# Patient Record
Sex: MALE | Race: ASIAN | Employment: OTHER | URBAN - METROPOLITAN AREA
[De-identification: names, ages, dates, MRNs, and addresses within clinical notes are randomized per-mention and may not be internally consistent; named-entity substitution may affect disease eponyms.]

---

## 2020-06-25 ENCOUNTER — HOSPITAL ENCOUNTER (OUTPATIENT)
Dept: GENERAL RADIOLOGY | Age: 80
Discharge: HOME | End: 2020-06-25
Attending: FAMILY MEDICINE
Payer: MEDICARE

## 2020-06-25 ENCOUNTER — OFFICE VISIT (OUTPATIENT)
Dept: FAMILY MEDICINE CLINIC | Facility: CLINIC | Age: 80
End: 2020-06-25
Payer: MEDICARE

## 2020-06-25 VITALS
HEART RATE: 78 BPM | TEMPERATURE: 97 F | BODY MASS INDEX: 25.47 KG/M2 | SYSTOLIC BLOOD PRESSURE: 142 MMHG | RESPIRATION RATE: 16 BRPM | WEIGHT: 170 LBS | DIASTOLIC BLOOD PRESSURE: 72 MMHG | HEIGHT: 68.5 IN

## 2020-06-25 DIAGNOSIS — G89.29 COCCYGEAL PAIN, CHRONIC: ICD-10-CM

## 2020-06-25 DIAGNOSIS — E03.9 HYPOTHYROIDISM, UNSPECIFIED TYPE: ICD-10-CM

## 2020-06-25 DIAGNOSIS — M54.50 CHRONIC MIDLINE LOW BACK PAIN WITHOUT SCIATICA: ICD-10-CM

## 2020-06-25 DIAGNOSIS — Z76.89 ESTABLISHING CARE WITH NEW DOCTOR, ENCOUNTER FOR: Primary | ICD-10-CM

## 2020-06-25 DIAGNOSIS — M53.3 COCCYGEAL PAIN, CHRONIC: ICD-10-CM

## 2020-06-25 DIAGNOSIS — G89.29 CHRONIC MIDLINE LOW BACK PAIN WITHOUT SCIATICA: ICD-10-CM

## 2020-06-25 DIAGNOSIS — E78.5 HYPERLIPIDEMIA, UNSPECIFIED HYPERLIPIDEMIA TYPE: ICD-10-CM

## 2020-06-25 PROCEDURE — 99204 OFFICE O/P NEW MOD 45 MIN: CPT | Performed by: FAMILY MEDICINE

## 2020-06-25 PROCEDURE — 72220 X-RAY EXAM SACRUM TAILBONE: CPT | Performed by: FAMILY MEDICINE

## 2020-06-25 RX ORDER — LEVOTHYROXINE SODIUM 0.07 MG/1
75 TABLET ORAL DAILY
COMMUNITY
Start: 2020-04-18

## 2020-06-25 NOTE — PATIENT INSTRUCTIONS
Medication reviewed and renewed where needed and appropriate. Encouraged physical fitness and daily physical activity daily (PLAY). Comply with medications. Consider physiatry consultation. Consider lumbar plain film. Consider also focused PT/OT.

## 2020-06-25 NOTE — PROGRESS NOTES
HPI:    Patient ID: Derrek Aguirre is a 78year old male.     78year old [facility] ([facility]) male here for complete preventive care physical and for status update on any confirmed chronic medical illnesses and follow up on any previous labs or procedures that were suggest 142/72   Pulse:    Resp:    Temp:        PHYSICAL EXAM:   Physical Exam    Constitutional: He is oriented to person, place, and time. He appears well-developed and well-nourished. No distress.    HENT:   Right Ear: Tympanic membrane and ear canal normal. 8/6/2020), or if symptoms worsen or fail to improve.          IO#[id number]

## 2020-07-13 ENCOUNTER — TELEPHONE (OUTPATIENT)
Dept: FAMILY MEDICINE CLINIC | Facility: CLINIC | Age: 80
End: 2020-07-13

## 2020-07-13 NOTE — TELEPHONE ENCOUNTER
Pt called for results of Xray ordered on 6/25/2020. Noted pt viewed Xray results in [address], pt states he didn't review doctor's message as he couldn't get in [address], he is enrolled through [facility]. Reviewed xray results as noted below.  Pt is

## 2020-07-15 NOTE — TELEPHONE ENCOUNTER
I spoke with the patient regarding his concerns for his newly realized diagnosis of spina bifida occulta.   He is going to keep his appointment with me in August and we will plan to refer him to physiatry for further treatment options for comfort and also f

## 2020-08-11 ENCOUNTER — OFFICE VISIT (OUTPATIENT)
Dept: FAMILY MEDICINE CLINIC | Facility: CLINIC | Age: 80
End: 2020-08-11
Payer: MEDICARE

## 2020-08-11 VITALS
SYSTOLIC BLOOD PRESSURE: 108 MMHG | DIASTOLIC BLOOD PRESSURE: 68 MMHG | RESPIRATION RATE: 16 BRPM | HEART RATE: 81 BPM | HEIGHT: 68.5 IN | BODY MASS INDEX: 25 KG/M2

## 2020-08-11 DIAGNOSIS — Q76.0 SBO (SPINA BIFIDA OCCULTA): Primary | ICD-10-CM

## 2020-08-11 PROCEDURE — 99214 OFFICE O/P EST MOD 30 MIN: CPT | Performed by: FAMILY MEDICINE

## 2020-08-11 NOTE — PATIENT INSTRUCTIONS
Patient has been referred to physical therapy and he will be going to the [facility] facility. We will consider physiatry as the next measure of evaluation if needed.

## 2020-08-11 NOTE — PROGRESS NOTES
HPI:    Patient ID: Tahmina El is a 78year old male.     This is an 78-year-old [facility]) gentleman who is doing a follow-up regarding his low back discomfort and associated lower extremity neuropathy which has been found to be secondary to spina bifida occu range of motion, tenderness, pain and spasm. Neurological: He is alert and oriented to person, place, and time. ASSESSMENT/PLAN:   1.  SBO (spina bifida occulta)  Physical therapy has been ordered.  - PHYSICAL THERAPY - INTERNAL    No orders

## 2020-08-31 ENCOUNTER — TELEPHONE (OUTPATIENT)
Dept: FAMILY MEDICINE CLINIC | Facility: CLINIC | Age: 80
End: 2020-08-31

## 2020-08-31 NOTE — TELEPHONE ENCOUNTER
[facility] from [facility] [address] is requesting fax to be sent to [phone]. Patient has an appointment scheduled for 9/1.

## 2020-08-31 NOTE — TELEPHONE ENCOUNTER
LOV 8/11/20 for SBO. PT referral was provided at this visit. Pending authorization. Managed care, please advise if this referral may be used for location specified below. Thank you.  appt is tomorrow for PT.

## 2020-08-31 NOTE — TELEPHONE ENCOUNTER
Per patient he would like his Order for physical Therapy fax to [phone] [facility] Tel# [phone] Fax# [phone]. No appointment yet.

## 2020-09-02 ENCOUNTER — APPOINTMENT (OUTPATIENT)
Dept: PHYSICAL THERAPY | Age: 80
End: 2020-09-02
Attending: FAMILY MEDICINE
Payer: MEDICARE

## 2020-09-04 ENCOUNTER — APPOINTMENT (OUTPATIENT)
Dept: PHYSICAL THERAPY | Age: 80
End: 2020-09-04
Attending: FAMILY MEDICINE
Payer: MEDICARE

## 2020-09-09 ENCOUNTER — APPOINTMENT (OUTPATIENT)
Dept: PHYSICAL THERAPY | Age: 80
End: 2020-09-09
Attending: FAMILY MEDICINE
Payer: MEDICARE

## 2020-09-11 ENCOUNTER — APPOINTMENT (OUTPATIENT)
Dept: PHYSICAL THERAPY | Age: 80
End: 2020-09-11
Attending: FAMILY MEDICINE
Payer: MEDICARE

## 2020-09-15 ENCOUNTER — TELEPHONE (OUTPATIENT)
Dept: PHYSICAL THERAPY | Age: 80
End: 2020-09-15

## 2020-09-16 ENCOUNTER — APPOINTMENT (OUTPATIENT)
Dept: PHYSICAL THERAPY | Age: 80
End: 2020-09-16
Attending: FAMILY MEDICINE
Payer: MEDICARE

## 2020-09-18 ENCOUNTER — APPOINTMENT (OUTPATIENT)
Dept: PHYSICAL THERAPY | Age: 80
End: 2020-09-18
Attending: FAMILY MEDICINE
Payer: MEDICARE

## 2020-09-23 ENCOUNTER — APPOINTMENT (OUTPATIENT)
Dept: PHYSICAL THERAPY | Age: 80
End: 2020-09-23
Attending: FAMILY MEDICINE
Payer: MEDICARE

## 2020-09-25 ENCOUNTER — APPOINTMENT (OUTPATIENT)
Dept: PHYSICAL THERAPY | Age: 80
End: 2020-09-25
Attending: FAMILY MEDICINE
Payer: MEDICARE

## 2021-01-10 ENCOUNTER — PATIENT MESSAGE (OUTPATIENT)
Dept: FAMILY MEDICINE CLINIC | Facility: CLINIC | Age: 81
End: 2021-01-10

## 2021-01-12 NOTE — TELEPHONE ENCOUNTER
From: José Ratliff  To: Aleyda Valencia, DO  Sent: 1/10/2021 8:28 PM CST  Subject: Other    When will [facility] offer Kovid 19 vacccine. I am 78years old.

## 2021-03-09 DIAGNOSIS — Z23 NEED FOR VACCINATION: ICD-10-CM

## 2023-06-13 ENCOUNTER — PATIENT OUTREACH (OUTPATIENT)
Dept: CASE MANAGEMENT | Age: 83
End: 2023-06-13

## 2023-06-13 NOTE — PROCEDURES
The office order for PCP removal request is Approved and finalized on June 13, 2023.     Thanks,  [facility] [employer]